# Patient Record
Sex: MALE | Race: AMERICAN INDIAN OR ALASKA NATIVE | NOT HISPANIC OR LATINO | Employment: OTHER | URBAN - METROPOLITAN AREA
[De-identification: names, ages, dates, MRNs, and addresses within clinical notes are randomized per-mention and may not be internally consistent; named-entity substitution may affect disease eponyms.]

---

## 2019-03-13 PROBLEM — S61.211A LACERATION OF LEFT INDEX FINGER WITHOUT FOREIGN BODY WITHOUT DAMAGE TO NAIL: Status: ACTIVE | Noted: 2019-03-13

## 2019-03-13 PROBLEM — S62.609B: Status: ACTIVE | Noted: 2019-03-13

## 2024-02-24 PROBLEM — F15.959 METHAMPHETAMINE-INDUCED PSYCHOTIC DISORDER: Status: ACTIVE | Noted: 2024-02-24

## 2024-08-22 PROBLEM — G89.29 CHRONIC MIDLINE LOW BACK PAIN WITHOUT SCIATICA: Status: ACTIVE | Noted: 2024-08-22

## 2024-08-22 PROBLEM — H53.9 VISION CHANGES: Status: ACTIVE | Noted: 2024-08-22

## 2024-08-22 PROBLEM — M51.36 BULGING OF INTERVERTEBRAL DISC BETWEEN L4 AND L5: Status: ACTIVE | Noted: 2024-08-22

## 2024-08-22 PROBLEM — M51.369 BULGING OF INTERVERTEBRAL DISC BETWEEN L4 AND L5: Status: ACTIVE | Noted: 2024-08-22

## 2024-08-22 PROBLEM — M54.50 CHRONIC MIDLINE LOW BACK PAIN WITHOUT SCIATICA: Status: ACTIVE | Noted: 2024-08-22

## 2024-09-05 ENCOUNTER — ON-DEMAND VIRTUAL (OUTPATIENT)
Dept: URGENT CARE | Facility: CLINIC | Age: 49
End: 2024-09-05

## 2024-09-05 DIAGNOSIS — R22.1 SWOLLEN UVULA: Primary | ICD-10-CM

## 2024-09-05 RX ORDER — METHYLPREDNISOLONE 4 MG/1
TABLET ORAL
Qty: 21 EACH | Refills: 0 | Status: SHIPPED | OUTPATIENT
Start: 2024-09-05

## 2024-09-05 NOTE — PROGRESS NOTES
Subjective:      Patient ID: Chris Bedoya is a 48 y.o. male.    Vitals:  vitals were not taken for this visit.     Chief Complaint: Sore Throat and Fever      Visit Type: TELE AUDIOVISUAL    Present with the patient at the time of consultation: TELEMED PRESENT WITH PATIENT: None, at home    Past Medical History:   Diagnosis Date    Hemorrhoids     Hemorrhoids      Past Surgical History:   Procedure Laterality Date    FLEXOR TENDON REPAIR Left 3/13/2019    Procedure: REPAIR, TENDON, FLEXOR;  Surgeon: Hector Gan MD;  Location: [facility];  Service: Orthopedics;  Laterality: Left;  flexor polilicis longur tendon    HAND SURGERY      IRRIGATION AND DEBRIDEMENT OF UPPER EXTREMITY Left 3/13/2019    Procedure: IRRIGATION AND DEBRIDEMENT, UPPER EXTREMITY;  Surgeon: Hector Gan MD;  Location: [facility];  Service: Orthopedics;  Laterality: Left;  index finger   thumb    NERVE REPAIR Left 3/13/2019    Procedure: REPAIR, NERVE;  Surgeon: Hector Gan MD;  Location: [facility];  Service: Orthopedics;  Laterality: Left;  ulnar digital nerve    OPEN REDUCTION AND INTERNAL FIXATION (ORIF) OF INJURY OF FINGER  3/13/2019    Procedure: ORIF, FINGER;  Surgeon: Hector Gan MD;  Location: [facility];  Service: Orthopedics;;  index finger    REPAIR OF EXTENSOR TENDON Left 3/13/2019    Procedure: REPAIR, TENDON, EXTENSOR;  Surgeon: Hector Gan MD;  Location: [facility];  Service: Orthopedics;  Laterality: Left;  central slip repair     Review of patient's allergies indicates:  No Known Allergies  Current Outpatient Medications on File Prior to Visit   Medication Sig Dispense Refill    meloxicam (MOBIC) 15 MG tablet Take 1 tablet (15 mg total) by mouth once daily. 90 tablet 3    naloxone (NARCAN) 4 mg/actuation Spry 4mg by nasal route as needed for opioid overdose; may repeat every 2-3 minutes in alternating nostrils until medical help arrives. Call 911 1 each 11    nicotine (NICODERM CQ) 21 mg/24 hr Place 1 patch onto the skin  once daily. 28 patch 2    traZODone (DESYREL) 100 MG tablet Take 1 tablet (100 mg total) by mouth every evening. 30 tablet 11     No current facility-administered medications on file prior to visit.     No family history on file.    Medications Ordered                [id number] - [place], [place] - [address] AT [address] [facility] [address]   [address] [facility], [place] [address]    Telephone: [phone]   Fax: [phone]   Hours: Not open 24 hours                         E-Prescribed (1 of 1)              methylPREDNISolone (MEDROL DOSEPACK) 4 mg tablet    Sig: use as directed       Start: 9/5/24     Quantity: 21 each Refills: 0                           [facility] Peq Odvv Intake    9/5/2024  7:57 AM CDT - Filed by Patient   What is your current physical address in the event of a medical emergency?    Are you able to take your vital signs? No   Please attach any relevant images or files          Woke up this AM with uvula swelling. No sore throat. No airway compromise. No drooling. No fever. No other associated symptoms to report.    Sore Throat   Pertinent negatives include no coughing, shortness of breath, stridor or trouble swallowing.   Fever   Pertinent negatives include no coughing, sore throat or wheezing.       Constitution: Negative for fever.   HENT:  Negative for sore throat, trouble swallowing and voice change.         Swollen uvula   Neck: Negative for painful lymph nodes.   Respiratory:  Negative for cough, shortness of breath, stridor and wheezing.    Hematologic/Lymphatic: Negative for swollen lymph nodes.        Objective:   The physical exam was conducted virtually.  Physical Exam   Constitutional: He is oriented to person, place, and time. He does not appear ill. No distress.   HENT:   Head: Normocephalic and atraumatic.   Nose: Nose normal.   Mouth/Throat: Uvula is midline. Uvula swelling present.   Eyes: Extraocular movement intact   Pulmonary/Chest: Effort normal.    Abdominal: Normal appearance.   Musculoskeletal: Normal range of motion.         General: Normal range of motion.   Neurological: no focal deficit. He is alert and oriented to person, place, and time.   Psychiatric: His behavior is normal. Mood normal.   Vitals reviewed.      Assessment:     1. Swollen uvula        Plan:   Further testing recommended.    Patient encouraged to monitor symptoms closely and instructed to follow-up for new or worsening symptoms. Further, in-person, evaluation may be necessary for continued treatment. Please follow up with your primary care doctor or specialist as needed. Verbally discussed plan. Patient confirms understanding and is in agreement with treatment and plan.     You must understand that you've received a [facility] evaluation only and that you may be released before all your medical problems are known or treated. You, the patient, will arrange for follow up care as instructed.      Swollen uvula  -     methylPREDNISolone (MEDROL DOSEPACK) 4 mg tablet; use as directed  Dispense: 21 each; Refill: 0                Patient Instructions   OVER THE COUNTER RECOMMENDATIONS/SUGGESTIONS (IF NO CONTRAINDICATIONS).     ·         Make sure to stay well hydrated.     ·         Use Nasal Saline to mechanically move any post nasal drip from your eustachian tube or from the back of your throat.     ·         Use warm saltwater gargles to ease your throat pain. Warm saltwater gargles as needed for sore throat-  1/2 tsp salt to 1 cup warm water, gargle as desired. Warm fluids tend to relieve a sore throat.     .         Throat lozenges, Chloraseptic spray or other over the counter treatments are ok to use as well. Use as directed.     ·         Use an antihistamine such as Claritin, Zyrtec or Allegra to dry you out.     ·         Use pseudoephedrine (behind the counter) to decongest. Pseudoephedrine  30 mg up to 240 mg /day. It can raise your blood pressure and give you palpitations.      ·         Use Mucinex (guaifenesin) to break up mucous up to 2400mg/day to loosen any mucous.     ·         The Mucinex DM pill has a cough suppressant that can be sedating. It can be used at night to stop the tickle at the back of your throat.     ·         You can use Mucinex D (it has guaifenesin and a high dose of pseudoephedrine) in the mornings to help decongest.     ·         Use Afrin (oxymetazoline) in each nare for no longer than 3 days, as it is addictive. It can also dry out your mucous membranes and cause elevated blood pressure. This is especially useful if you are flying.     ·         Use Flonase 1-2 sprays/nostril per day. It is a local acting steroid nasal spray, if you develop a bloody nose, stop using the medication immediately.     ·         Sometimes Nyquil at night is beneficial to help you get some rest, however it is sedating, and it does have an antihistamine, and Tylenol.     ·         Honey is a natural cough suppressant that can be used.     ·         Tylenol up to 4,000 mg a day is safe for short periods and can be used for body aches, pain, and fever. However, in high doses and prolonged use it can cause liver irritation.     ·         Ibuprofen is a non-steroidal anti-inflammatory that can be used for body aches, pain, and fever. However, it can also cause stomach irritation if overused.

## 2024-10-17 ENCOUNTER — PATIENT MESSAGE (OUTPATIENT)
Dept: RESEARCH | Facility: HOSPITAL | Age: 49
End: 2024-10-17
Payer: MEDICAID